# Patient Record
Sex: FEMALE | Race: WHITE | Employment: STUDENT | URBAN - METROPOLITAN AREA
[De-identification: names, ages, dates, MRNs, and addresses within clinical notes are randomized per-mention and may not be internally consistent; named-entity substitution may affect disease eponyms.]

---
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
## 2023-10-11 PROBLEM — E55.9 VITAMIN D INSUFFICIENCY: Status: ACTIVE | Noted: 2023-10-11

## 2023-10-11 PROBLEM — Z34.83 MULTIGRAVIDA IN THIRD TRIMESTER: Status: ACTIVE | Noted: 2021-05-12

## 2023-10-11 PROBLEM — D50.9 IRON DEFICIENCY ANEMIA OF PREGNANCY: Status: ACTIVE | Noted: 2023-10-11

## 2023-10-11 PROBLEM — O99.019 IRON DEFICIENCY ANEMIA OF PREGNANCY: Status: ACTIVE | Noted: 2023-10-11

## 2023-10-13 ENCOUNTER — HOSPITAL ENCOUNTER (OUTPATIENT)
Age: 23
Setting detail: OBSERVATION
Discharge: HOME | End: 2023-10-13
Attending: OBSTETRICS & GYNECOLOGY | Admitting: OBSTETRICS & GYNECOLOGY
Payer: MEDICAID

## 2023-10-13 VITALS
SYSTOLIC BLOOD PRESSURE: 115 MMHG | RESPIRATION RATE: 17 BRPM | TEMPERATURE: 98.1 F | BODY MASS INDEX: 28.85 KG/M2 | WEIGHT: 169 LBS | DIASTOLIC BLOOD PRESSURE: 55 MMHG | HEIGHT: 64 IN | HEART RATE: 99 BPM

## 2023-10-13 PROBLEM — A74.9 CHLAMYDIA: Status: ACTIVE | Noted: 2023-10-13

## 2023-10-13 PROBLEM — R10.30 LOWER ABDOMINAL PAIN: Status: ACTIVE | Noted: 2023-10-13

## 2023-10-13 LAB
AMNISURE, POC: NEGATIVE
BACTERIA URNS QL MICRO: ABNORMAL
BILIRUB UR QL STRIP: ABNORMAL
CLARITY UR: CLEAR
COLOR UR: YELLOW
CRYSTALS URNS MICRO: ABNORMAL /HPF
EPI CELLS #/AREA URNS HPF: ABNORMAL /HPF
GLUCOSE UR STRIP-MCNC: NEGATIVE MG/DL
HGB UR QL STRIP.AUTO: NEGATIVE
KETONES UR STRIP-MCNC: ABNORMAL MG/DL
LEUKOCYTE ESTERASE UR QL STRIP: ABNORMAL
Lab: NORMAL
NEGATIVE QC PASS/FAIL: NORMAL
NITRITE UR QL STRIP: NEGATIVE
PH UR STRIP: 6 [PH] (ref 5–9)
POSITIVE QC PASS/FAIL: NORMAL
PROT UR STRIP-MCNC: ABNORMAL MG/DL
RBC #/AREA URNS HPF: ABNORMAL /HPF
SP GR UR STRIP: >1.03 (ref 1–1.03)
UROBILINOGEN UR STRIP-ACNC: 1 EU/DL (ref 0–1)
WBC #/AREA URNS HPF: ABNORMAL /HPF

## 2023-10-13 PROCEDURE — 81001 URINALYSIS AUTO W/SCOPE: CPT

## 2023-10-13 PROCEDURE — 96360 HYDRATION IV INFUSION INIT: CPT

## 2023-10-13 PROCEDURE — 84112 EVAL AMNIOTIC FLUID PROTEIN: CPT

## 2023-10-13 PROCEDURE — 2580000003 HC RX 258: Performed by: ADVANCED PRACTICE MIDWIFE

## 2023-10-13 PROCEDURE — G0378 HOSPITAL OBSERVATION PER HR: HCPCS

## 2023-10-13 RX ORDER — SODIUM CHLORIDE, SODIUM LACTATE, POTASSIUM CHLORIDE, AND CALCIUM CHLORIDE .6; .31; .03; .02 G/100ML; G/100ML; G/100ML; G/100ML
500 INJECTION, SOLUTION INTRAVENOUS ONCE
Status: COMPLETED | OUTPATIENT
Start: 2023-10-13 | End: 2023-10-13

## 2023-10-13 RX ADMIN — SODIUM CHLORIDE, POTASSIUM CHLORIDE, SODIUM LACTATE AND CALCIUM CHLORIDE 500 ML: 600; 310; 30; 20 INJECTION, SOLUTION INTRAVENOUS at 17:27

## 2023-10-13 NOTE — PROGRESS NOTES
UA results reviewed with Dr. Shivani Rodriguez. Ok to discharge patient home encouraging to consume more fluids and follow up with Dr. Gabino Perez as scheduled. Patient verbalized understanding at bedside to this nurse.

## 2023-10-13 NOTE — PROGRESS NOTES
Patient presents to unit with complaints of pelvic pressure and ctx that started around 2 nights ago. She is a 3/2 36.5 patient of Dr. Telma Reyes.  Patient placed on EFM and house officer notified of arrival.

## 2023-10-13 NOTE — H&P
[id number] 36w5d   2, GBS: unknown  3. Fetal Heart Tracing category: 1  4. Not ruptured  5.  Not in Labor    DW Dr Niall Alston:   IV bolus  Send U/A    Electronically signed by Danial Sandhoff, APRN - CNM on 10/13/2023 at 4:14 PM

## 2023-10-25 ENCOUNTER — HOSPITAL ENCOUNTER (INPATIENT)
Age: 23
LOS: 2 days | Discharge: HOME | End: 2023-10-27
Attending: OBSTETRICS & GYNECOLOGY | Admitting: OBSTETRICS & GYNECOLOGY
Payer: MEDICAID

## 2023-10-25 ENCOUNTER — ANESTHESIA (OUTPATIENT)
Dept: LABOR AND DELIVERY | Age: 23
DRG: 560 | End: 2023-10-25
Payer: MEDICAID

## 2023-10-25 ENCOUNTER — ANESTHESIA EVENT (OUTPATIENT)
Dept: LABOR AND DELIVERY | Age: 23
DRG: 560 | End: 2023-10-25
Payer: MEDICAID

## 2023-10-25 PROBLEM — O98.813 CHLAMYDIA INFECTION AFFECTING PREGNANCY IN THIRD TRIMESTER: Status: ACTIVE | Noted: 2023-10-25

## 2023-10-25 PROBLEM — R10.30 LOWER ABDOMINAL PAIN: Status: RESOLVED | Noted: 2023-10-13 | Resolved: 2023-10-25

## 2023-10-25 PROBLEM — O36.5990 ASYMMETRIC INTRAUTERINE GROWTH RESTRICTION AFFECTING PREGNANCY, ANTEPARTUM: Status: ACTIVE | Noted: 2023-10-25

## 2023-10-25 PROBLEM — Z3A.38 38 WEEKS GESTATION OF PREGNANCY: Status: ACTIVE | Noted: 2023-10-25

## 2023-10-25 PROBLEM — O36.5930 POOR FETAL GROWTH AFFECTING MANAGEMENT OF MOTHER IN THIRD TRIMESTER: Status: ACTIVE | Noted: 2023-10-25

## 2023-10-25 PROBLEM — A74.9 CHLAMYDIA INFECTION AFFECTING PREGNANCY IN THIRD TRIMESTER: Status: ACTIVE | Noted: 2023-10-25

## 2023-10-25 PROBLEM — A74.9 CHLAMYDIA: Status: RESOLVED | Noted: 2023-10-13 | Resolved: 2023-10-25

## 2023-10-25 LAB
ABO + RH BLD: NORMAL
AMPHET UR QL SCN: NEGATIVE
ARM BAND NUMBER: NORMAL
BARBITURATES UR QL SCN: NEGATIVE
BENZODIAZ UR QL: NEGATIVE
BLOOD BANK SAMPLE EXPIRATION: NORMAL
BLOOD GROUP ANTIBODIES SERPL: NEGATIVE
BUPRENORPHINE UR QL: NEGATIVE
CANNABINOIDS UR QL SCN: NEGATIVE
COCAINE UR QL SCN: NEGATIVE
ERYTHROCYTE [DISTWIDTH] IN BLOOD BY AUTOMATED COUNT: 14.9 % (ref 11.5–15)
FENTANYL UR QL: NEGATIVE
HCT VFR BLD AUTO: 33.1 % (ref 34–48)
HGB BLD-MCNC: 10.7 G/DL (ref 11.5–15.5)
MCH RBC QN AUTO: 28.5 PG (ref 26–35)
MCHC RBC AUTO-ENTMCNC: 32.3 G/DL (ref 32–34.5)
MCV RBC AUTO: 88.3 FL (ref 80–99.9)
METHADONE UR QL: NEGATIVE
OPIATES UR QL SCN: NEGATIVE
OXYCODONE UR QL SCN: NEGATIVE
PCP UR QL SCN: NEGATIVE
PLATELET # BLD AUTO: 297 K/UL (ref 130–450)
PMV BLD AUTO: 10.3 FL (ref 7–12)
RBC # BLD AUTO: 3.75 M/UL (ref 3.5–5.5)
TEST INFORMATION: NORMAL
WBC OTHER # BLD: 13 K/UL (ref 4.5–11.5)

## 2023-10-25 PROCEDURE — 86901 BLOOD TYPING SEROLOGIC RH(D): CPT

## 2023-10-25 PROCEDURE — 51701 INSERT BLADDER CATHETER: CPT

## 2023-10-25 PROCEDURE — 2580000003 HC RX 258: Performed by: OBSTETRICS & GYNECOLOGY

## 2023-10-25 PROCEDURE — 1220000001 HC SEMI PRIVATE L&D R&B

## 2023-10-25 PROCEDURE — 2500000003 HC RX 250 WO HCPCS: Performed by: STUDENT IN AN ORGANIZED HEALTH CARE EDUCATION/TRAINING PROGRAM

## 2023-10-25 PROCEDURE — 86850 RBC ANTIBODY SCREEN: CPT

## 2023-10-25 PROCEDURE — 85027 COMPLETE CBC AUTOMATED: CPT

## 2023-10-25 PROCEDURE — 59050 FETAL MONITOR W/REPORT: CPT | Performed by: ADVANCED PRACTICE MIDWIFE

## 2023-10-25 PROCEDURE — 3700000025 EPIDURAL BLOCK: Performed by: STUDENT IN AN ORGANIZED HEALTH CARE EDUCATION/TRAINING PROGRAM

## 2023-10-25 PROCEDURE — 6360000002 HC RX W HCPCS: Performed by: OBSTETRICS & GYNECOLOGY

## 2023-10-25 PROCEDURE — 80307 DRUG TEST PRSMV CHEM ANLYZR: CPT

## 2023-10-25 PROCEDURE — 86900 BLOOD TYPING SEROLOGIC ABO: CPT

## 2023-10-25 RX ORDER — CARBOPROST TROMETHAMINE 250 UG/ML
250 INJECTION, SOLUTION INTRAMUSCULAR PRN
Status: DISCONTINUED | OUTPATIENT
Start: 2023-10-25 | End: 2023-10-26

## 2023-10-25 RX ORDER — SODIUM CHLORIDE, SODIUM LACTATE, POTASSIUM CHLORIDE, AND CALCIUM CHLORIDE .6; .31; .03; .02 G/100ML; G/100ML; G/100ML; G/100ML
500 INJECTION, SOLUTION INTRAVENOUS PRN
Status: DISCONTINUED | OUTPATIENT
Start: 2023-10-25 | End: 2023-10-26

## 2023-10-25 RX ORDER — SODIUM CHLORIDE, SODIUM LACTATE, POTASSIUM CHLORIDE, CALCIUM CHLORIDE 600; 310; 30; 20 MG/100ML; MG/100ML; MG/100ML; MG/100ML
INJECTION, SOLUTION INTRAVENOUS CONTINUOUS
Status: DISCONTINUED | OUTPATIENT
Start: 2023-10-25 | End: 2023-10-26

## 2023-10-25 RX ORDER — SODIUM CHLORIDE 0.9 % (FLUSH) 0.9 %
5-40 SYRINGE (ML) INJECTION EVERY 12 HOURS SCHEDULED
Status: DISCONTINUED | OUTPATIENT
Start: 2023-10-25 | End: 2023-10-26

## 2023-10-25 RX ORDER — MISOPROSTOL 200 UG/1
800 TABLET ORAL PRN
Status: DISCONTINUED | OUTPATIENT
Start: 2023-10-25 | End: 2023-10-26

## 2023-10-25 RX ORDER — SODIUM CHLORIDE 9 MG/ML
25 INJECTION, SOLUTION INTRAVENOUS PRN
Status: DISCONTINUED | OUTPATIENT
Start: 2023-10-25 | End: 2023-10-26

## 2023-10-25 RX ORDER — ACETAMINOPHEN 650 MG
TABLET, EXTENDED RELEASE ORAL
Status: COMPLETED
Start: 2023-10-25 | End: 2023-10-26

## 2023-10-25 RX ORDER — SODIUM CHLORIDE 0.9 % (FLUSH) 0.9 %
5-40 SYRINGE (ML) INJECTION PRN
Status: DISCONTINUED | OUTPATIENT
Start: 2023-10-25 | End: 2023-10-26

## 2023-10-25 RX ORDER — SODIUM CHLORIDE, SODIUM LACTATE, POTASSIUM CHLORIDE, AND CALCIUM CHLORIDE .6; .31; .03; .02 G/100ML; G/100ML; G/100ML; G/100ML
1000 INJECTION, SOLUTION INTRAVENOUS PRN
Status: DISCONTINUED | OUTPATIENT
Start: 2023-10-25 | End: 2023-10-26

## 2023-10-25 RX ORDER — NALOXONE HYDROCHLORIDE 0.4 MG/ML
INJECTION, SOLUTION INTRAMUSCULAR; INTRAVENOUS; SUBCUTANEOUS PRN
Status: DISCONTINUED | OUTPATIENT
Start: 2023-10-25 | End: 2023-10-26

## 2023-10-25 RX ORDER — ONDANSETRON 2 MG/ML
4 INJECTION INTRAMUSCULAR; INTRAVENOUS EVERY 6 HOURS PRN
Status: DISCONTINUED | OUTPATIENT
Start: 2023-10-25 | End: 2023-10-26

## 2023-10-25 RX ORDER — MORPHINE SULFATE 2 MG/ML
1 INJECTION, SOLUTION INTRAMUSCULAR; INTRAVENOUS
Status: DISCONTINUED | OUTPATIENT
Start: 2023-10-25 | End: 2023-10-26

## 2023-10-25 RX ORDER — METHYLERGONOVINE MALEATE 0.2 MG/ML
200 INJECTION INTRAVENOUS PRN
Status: DISCONTINUED | OUTPATIENT
Start: 2023-10-25 | End: 2023-10-26

## 2023-10-25 RX ORDER — LIDOCAINE HYDROCHLORIDE 10 MG/ML
INJECTION, SOLUTION INFILTRATION; PERINEURAL
Status: DISCONTINUED
Start: 2023-10-25 | End: 2023-10-26

## 2023-10-25 RX ADMIN — Medication 1 MILLI-UNITS/MIN: at 16:39

## 2023-10-25 RX ADMIN — Medication 15 ML/HR: at 21:06

## 2023-10-25 RX ADMIN — SODIUM CHLORIDE, POTASSIUM CHLORIDE, SODIUM LACTATE AND CALCIUM CHLORIDE: 600; 310; 30; 20 INJECTION, SOLUTION INTRAVENOUS at 16:37

## 2023-10-25 ASSESSMENT — LIFESTYLE VARIABLES: SMOKING_STATUS: 0

## 2023-10-25 NOTE — H&P
Department of Obstetrics and Gynecology  Labor and Delivery  History & Physical    Patient:  Jai Cabrera     Admit Date:  10/25/2023  3:50 PM  Medical Record Number:  [id number]    CHIEF COMPLAINT:  Pitocin IOL with BS=6 for asymmetrical IUGR    PROBLEM LIST:     Patient Active Problem List   Diagnosis    Multigravida in third trimester    Mixed anxiety and depressive disorder - takes Paxil 20 mg when not pregnant    Iron deficiency anemia of pregnancy    Vitamin D insufficiency    Poor fetal growth affecting management of mother in third trimester    Asymmetric intrauterine growth restriction affecting pregnancy, antepartum: EFW 5#2oz (<10%) AC 3.2%    Chlamydia infection affecting pregnancy in third trimester at 34w; DELMY completed 10/19 - NEGATIVE    38 3/7 weeks gestation of pregnancy           HISTORY OF PRESENT ILLNESS:    The patient is a 26 y.o. W female [id number] at 38w3d. Patient presents from the office 3cm dilated and a BS=10 for Pitocin IOL due to asymmetrical IUGR on US today done for lagging FH of 34cm at 38w. EFW 5#2oz (<10% with AC at 3,2%). In light of the favorable cervix the risk of poor fetal outcome should the pregnancy be allowed to continue exceeds the risk of fetal lung immaturity and the risk of  delivery. Patient denies contractions, LOF and VB. Has no sx UTI/PIH. ESTIMATED DUE DATE: Estimated Date of Delivery: 23    PRENATA Labs: Genital L CARE:  Complicated by: see HPI and problem list  GBS: negative the papers were for antihypertensives    Past OB History  OB History          3    Para   2    Term   2            AB        Living   2         SAB        IAB        Ectopic        Molar        Multiple   0    Live Births   2              Past Medical History:        Diagnosis Date    Anxiety     No Hx hospitalization. Has Psych and ounelor. Taking buspar. Heart murmur     Asymptomatic.     Iron deficiency anemia of pregnancy 10/11/2023    Mental disorder perineum    Continuous external fetal heart rate monitoring    External uterine contraction monitoring    I/O per unit routine    Initiate Hypoglycemia Treatment    Patient may have epidural    Full Code    Nonrebreather mask oxygen    TYPE AND SCREEN    Rupture of Membranes    Admit to L&D      Current Facility-Administered Medications   Medication Dose Route Frequency Provider Last Rate Last Admin    lactated ringers IV soln infusion   IntraVENous Continuous Davi Stanley  mL/hr at 10/25/23 1843 Rate Verify at 10/25/23 1843    0.9 % sodium chloride infusion  25 mL IntraVENous PRN Davi Stanley MD        [facility] injection 4 mg  4 mg IntraVENous Q6H PRN Davi Stanley MD        oxytocin (PITOCIN) 15 units in 250 mL infusion  1-20 sylvie-units/min IntraVENous Continuous Davi Stanley MD 4 mL/hr at 10/25/23 1843 4 sylvie-units/min at 10/25/23 1843    morphine (PF) injection 1 mg  1 mg IntraVENous Q3H PRN Davi Stanley MD         The risks of labor induction have been reviewed including the increased risk of  section and its associated risks. Questions were answered to patient's and the FOB's satisfaction. Davi Stanley MD, MPER.  FACOG  10/25/2023 6:43 PM

## 2023-10-25 NOTE — PROGRESS NOTES
Spoke with Dr. Yousif Partida on the phone and notified him of patient deceleration with minimal variability, now moderate variability with accelerations. When contractions increase, patient to be AROM and epidural when ready per Dr. Yousif Partida.

## 2023-10-25 NOTE — PROGRESS NOTES
Patient arrived to unit for IOL. SVE 3 cm per Dr. Nadya Kearney in office at 1500. Denies pain at this time, reports +FM, denies VB or LOF.  Call light within reach

## 2023-10-26 PROCEDURE — 7200000001 HC VAGINAL DELIVERY

## 2023-10-26 PROCEDURE — 1220000000 HC SEMI PRIVATE OB R&B

## 2023-10-26 PROCEDURE — 6370000000 HC RX 637 (ALT 250 FOR IP): Performed by: OBSTETRICS & GYNECOLOGY

## 2023-10-26 PROCEDURE — 10907ZC DRAINAGE OF AMNIOTIC FLUID, THERAPEUTIC FROM PRODUCTS OF CONCEPTION, VIA NATURAL OR ARTIFICIAL OPENING: ICD-10-PCS | Performed by: OBSTETRICS & GYNECOLOGY

## 2023-10-26 PROCEDURE — 3E033VJ INTRODUCTION OF OTHER HORMONE INTO PERIPHERAL VEIN, PERCUTANEOUS APPROACH: ICD-10-PCS | Performed by: OBSTETRICS & GYNECOLOGY

## 2023-10-26 PROCEDURE — 51701 INSERT BLADDER CATHETER: CPT

## 2023-10-26 RX ORDER — ACETAMINOPHEN 500 MG
1000 TABLET ORAL EVERY 6 HOURS PRN
Status: DISCONTINUED | OUTPATIENT
Start: 2023-10-26 | End: 2023-10-27 | Stop reason: HOSPADM

## 2023-10-26 RX ORDER — IBUPROFEN 600 MG/1
600 TABLET ORAL EVERY 6 HOURS PRN
Status: DISCONTINUED | OUTPATIENT
Start: 2023-10-26 | End: 2023-10-27 | Stop reason: HOSPADM

## 2023-10-26 RX ORDER — PROMETHAZINE HYDROCHLORIDE 25 MG/ML
6.25 INJECTION, SOLUTION INTRAMUSCULAR; INTRAVENOUS ONCE
Status: DISCONTINUED | OUTPATIENT
Start: 2023-10-26 | End: 2023-10-27 | Stop reason: HOSPADM

## 2023-10-26 RX ORDER — PRENATAL WITH FERROUS FUM AND FOLIC ACID 3080; 920; 120; 400; 22; 1.84; 3; 20; 10; 1; 12; 200; 27; 25; 2 [IU]/1; [IU]/1; MG/1; [IU]/1; MG/1; MG/1; MG/1; MG/1; MG/1; MG/1; UG/1; MG/1; MG/1; MG/1; MG/1
1 TABLET ORAL
Status: DISCONTINUED | OUTPATIENT
Start: 2023-10-26 | End: 2023-10-27 | Stop reason: HOSPADM

## 2023-10-26 RX ORDER — MODIFIED LANOLIN
OINTMENT (GRAM) TOPICAL PRN
Status: DISCONTINUED | OUTPATIENT
Start: 2023-10-26 | End: 2023-10-27 | Stop reason: HOSPADM

## 2023-10-26 RX ORDER — DOCUSATE SODIUM 100 MG/1
100 CAPSULE, LIQUID FILLED ORAL 2 TIMES DAILY
Status: DISCONTINUED | OUTPATIENT
Start: 2023-10-26 | End: 2023-10-27 | Stop reason: HOSPADM

## 2023-10-26 RX ADMIN — Medication: at 04:43

## 2023-10-26 RX ADMIN — IBUPROFEN 600 MG: 600 TABLET, FILM COATED ORAL at 16:55

## 2023-10-26 RX ADMIN — METFORMIN HYDROCHLORIDE 1 TABLET: 500 TABLET, EXTENDED RELEASE ORAL at 09:28

## 2023-10-26 RX ADMIN — ACETAMINOPHEN 1000 MG: 500 TABLET ORAL at 21:17

## 2023-10-26 RX ADMIN — Medication 166.7 ML: at 04:50

## 2023-10-26 RX ADMIN — DOCUSATE SODIUM 100 MG: 100 CAPSULE, LIQUID FILLED ORAL at 21:17

## 2023-10-26 RX ADMIN — DOCUSATE SODIUM 100 MG: 100 CAPSULE, LIQUID FILLED ORAL at 09:27

## 2023-10-26 ASSESSMENT — PAIN DESCRIPTION - LOCATION
LOCATION: ABDOMEN
LOCATION: ABDOMEN

## 2023-10-26 ASSESSMENT — PAIN SCALES - GENERAL
PAINLEVEL_OUTOF10: 7
PAINLEVEL_OUTOF10: 6

## 2023-10-26 ASSESSMENT — PAIN DESCRIPTION - ORIENTATION
ORIENTATION: LOWER
ORIENTATION: ANTERIOR;LOWER

## 2023-10-26 ASSESSMENT — PAIN DESCRIPTION - DESCRIPTORS: DESCRIPTORS: CRAMPING;DISCOMFORT

## 2023-10-26 NOTE — L&D DELIVERY NOTE
Department of Obstetrics and Gynecology  Spontaneous Vaginal Delivery Note    Patient:  Ayesha Maurer     Admit Date:  10/25/2023  3:50 PM  Medical Record Number:  [id number]   Procedure Date: 10/26/2023 5:15 AM     Pre-delivery Diagnosis:  Multigravid IUP at 38w 4d, asymmetrical IUGR, iron deficiency anemia, chlamydia infection in pregnancy (treated with negative test of cure), mixed anxiety/depressive disorder (on Paxil 20 mg when not pregnant, vitamin D insufficiency  Patient Active Problem List   Diagnosis    Multigravida in third trimester    Mixed anxiety and depressive disorder - takes Paxil 20 mg when not pregnant    Iron deficiency anemia of pregnancy    Vitamin D insufficiency    Poor fetal growth affecting management of mother in third trimester    Asymmetric intrauterine growth restriction affecting pregnancy, antepartum: EFW 5#2oz (<10%) AC 3.2%    Chlamydia infection affecting pregnancy in third trimester at [address]; DELMY completed 10/19 - NEGATIVE    38 4/7 weeks gestation of pregnancy       Post-delivery Diagnosis:  Living  infant Female    Information for the patient's :  Ayesha Zuniga [[id number]]   Normal Spontaneous Vaginal Delivery, uncomplicated     Delivering Clinician: Dariel     Infant Wt:   Information for the patient's :  Villa Mullins, Girl Amanda [[id number]]   5 pounds 9.2 ounces  2530 g     APGARS:     Information for the patient's :  Villa Mullins Girl Amanda [[id number]]   1 minute: 8  5 minute: 9    Anesthesia:  epidural anesthesia    Application and Delivery:  26 y.o.  W female [id number] at 41w2d with a history of mixed anxiety and depressive disorder, iron deficiency anemia, vitamin D insufficiency and a chlamydia infection in late third trimester (treated with a negative test of cure) was admitted from the office 3cm dilated and a BS=10 for Pitocin IOL due to asymmetrical IUGR on US today done for lagging FH of 34cm at 38w (EFW 5#2oz; <10% with AC at 3.2%) who progressed to complete with augmentation by amniotomy and delivered Cephalic, right occiput anterior presentation via  @ 0443, under epidural anesthesia anesthesia,  over an intact perineum without a resulting laceration, without dystocia or complication, a Live Born Female infant, weighing 5# 9oz, 2530 grams, Clear fluid at delivery, bulb suctioned on perineum. A nuchal cord was not present. A delayed cord clamping was performed. Spontaneous cry,  Apgar's 1 minute:  8; 5 minute:  9. The placenta was delivered with gentle traction and was noted to be small but intact, whole, and that the umbilical cord had three vessels noted. Episiotomy was not needed. Repair not necessary. Cervix, rectum, sphincter intact. Sponge, needle, and instrument counts correct x 2. Delivery Summary:  Ricky Owusu [[id number]]      Labor Events     Labor: No  Cervical Ripening Date/Time:      Antibiotics Received during Labor: No  Rupture Identifier: Sac 1  Rupture Date/Time:  10/25/23 21:27:00   Rupture Type: AROM  Fluid Color: Clear  Fluid Odor: None  Fluid Volume:  Moderate  Induction: Oxytocin, AROM  Augmentation: None  Labor Complications: None   Additional Complications:  OB: DELIVERY - COMPLICATIONS   Asymmetric intrauterine growth restriction affecting pregnancy, antepartum        Anesthesia    Method: Epidural    Labor Event Times      Labor onset date/time:  10/25/23 21:21:00     Dilation complete date/time:  10/26/23 03:55:00 EDT     Start pushing date/time:  10/26/2023 04:43:00     Labor Length    1st stage: 6h 34m  2nd stage: 0h 48m  3rd stage: 0h 07m    Delivery Details      Delivery Date: 10/26/23 Delivery Time: 04:43:00   Delivery Type: Vaginal, Spontaneous     Presentation    Presentation: Vertex  Position: Right  _: Occiput  _: Anterior    Shoulder Dystocia    Shoulder Dystocia Present?: No    Assisted Delivery Details    Forceps Attempted?: No  Vacuum Extractor Attempted?: No    Cord

## 2023-10-26 NOTE — PROGRESS NOTES
RN remained at bedside throughout pushing. EFM continuously assessed. Vaginal delivery of viable infant girl @0443. APGARS 8/9. Delayed cord clamping performed. Bonding skin to skin.

## 2023-10-26 NOTE — PROGRESS NOTES
Called Dr. Kadeem Monreal to notify pt is complete/+1 and baby had a 2 minute prolonged deceleration. States he is on his way for delivery.

## 2023-10-26 NOTE — PROGRESS NOTES
Called ou to Dr. Taylor Jimenez to update on pt status, preparation for epidural  and AROM. Requesting internal monitors at this time due to difficulty tracing ctx and FHT. Orders received to get IUPC and FSE placed when AROM'd.

## 2023-10-26 NOTE — PROCEDURES
Called to room for AROM at the request of Roxanna Coleman. Vanessa Galdamez, Patient's last menstrual period was 01/09/2023., Estimated Date of Delivery: 11/5/23, 38w3d. History reviewed and there is no significant change except: pt now 3/70/-1    Pitocin 8 mu    FHT's 140 with moderate variability, positive accels, neg  decels. Contractions q 1-4 minutes    VAGINAL EXAM: 3/70/-1    AROM risks and benefits reviewed and procedure explained. AROM performed. Amniotic Fluid: clear   Patient comfortable         Risks and benefits of  IUPC and FSE reviewed; procedures explained.    IUPC placed at 5:00 o'clock position    Patient tolerated well     Continued labor management per Dr Roxanna Coleman

## 2023-10-26 NOTE — ANESTHESIA PROCEDURE NOTES
Epidural Block    Patient location during procedure: OB  Start time: 10/25/2023 8:55 PM  End time: 10/25/2023 9:00 PM  Reason for block: labor epidural  Staffing  Performed: resident/CRNA   Anesthesiologist: Hakan Lozano MD  Resident/CRNA: Dulcie Habermann, APRN - CRNA  Other anesthesia staff: Marlon Pope RN  Performed by: Dulcie Habermann, APRN - CRNA  Authorized by: Hakan Lozano MD    Epidural  Patient position: sitting  Prep: Betadine  Patient monitoring: cardiac monitor, continuous pulse ox and frequent blood pressure checks  Approach: midline  Location: L3-4  Injection technique: ADELE air  Provider prep: mask and sterile gloves  Needle  Needle type: Tuohy   Needle gauge: 18 G  Needle length: 3.5 in  Needle insertion depth: 6.5 cm  Catheter type: end hole  Catheter size: 20 G.   Catheter at skin depth: 13 cm  Test dose: negativeCatheter Secured: tegaderm and tape  Assessment  Hemodynamics: stable  Attempts: 1  Outcomes: uncomplicated and patient tolerated procedure well  Preanesthetic Checklist  Completed: patient identified, IV checked, site marked, risks and benefits discussed, surgical/procedural consents, equipment checked, pre-op evaluation, timeout performed, anesthesia consent given, oxygen available, monitors applied/VS acknowledged, fire risk safety assessment completed and verbalized and blood product R/B/A discussed and consented

## 2023-10-26 NOTE — PROGRESS NOTES
David Markham at nurses station Updated on [address] not working.  States she will be in to replace EARLE

## 2023-10-26 NOTE — PROGRESS NOTES
Patient admitted into room and oriented to surroundings. Introduced self and wrote this RN's name and phone extension on patient's white board. Phone and nurse's call light at patient's bedside and instructed to use for any needs. Patient instructed on new admission informational packet at bedside . Patient verbalized understanding of all of the above. Received TDAP vaccine during pregnancy and is refusing FLU vaccine.

## 2023-10-27 VITALS
OXYGEN SATURATION: 96 % | RESPIRATION RATE: 18 BRPM | DIASTOLIC BLOOD PRESSURE: 64 MMHG | TEMPERATURE: 98.6 F | HEART RATE: 101 BPM | SYSTOLIC BLOOD PRESSURE: 113 MMHG

## 2023-10-27 PROBLEM — A74.9 CHLAMYDIA INFECTION AFFECTING PREGNANCY IN THIRD TRIMESTER: Status: RESOLVED | Noted: 2023-10-25 | Resolved: 2023-10-27

## 2023-10-27 PROBLEM — O98.813 CHLAMYDIA INFECTION AFFECTING PREGNANCY IN THIRD TRIMESTER: Status: RESOLVED | Noted: 2023-10-25 | Resolved: 2023-10-27

## 2023-10-27 PROBLEM — O36.5930 POOR FETAL GROWTH AFFECTING MANAGEMENT OF MOTHER IN THIRD TRIMESTER: Status: RESOLVED | Noted: 2023-10-25 | Resolved: 2023-10-27

## 2023-10-27 PROBLEM — Z34.83 MULTIGRAVIDA IN THIRD TRIMESTER: Status: RESOLVED | Noted: 2021-05-12 | Resolved: 2023-10-27

## 2023-10-27 PROBLEM — Z3A.38 38 WEEKS GESTATION OF PREGNANCY: Status: RESOLVED | Noted: 2023-10-25 | Resolved: 2023-10-27

## 2023-10-27 PROBLEM — O36.5990 ASYMMETRIC INTRAUTERINE GROWTH RESTRICTION AFFECTING PREGNANCY, ANTEPARTUM: Status: RESOLVED | Noted: 2023-10-25 | Resolved: 2023-10-27

## 2023-10-27 PROCEDURE — 6370000000 HC RX 637 (ALT 250 FOR IP): Performed by: OBSTETRICS & GYNECOLOGY

## 2023-10-27 RX ORDER — IBUPROFEN 600 MG/1
600 TABLET ORAL EVERY 6 HOURS PRN
Qty: 60 TABLET | Refills: 0 | Status: SHIPPED | OUTPATIENT
Start: 2023-10-27

## 2023-10-27 RX ORDER — MODIFIED LANOLIN
1 OINTMENT (GRAM) TOPICAL PRN
COMMUNITY
Start: 2023-10-27

## 2023-10-27 RX ORDER — PSEUDOEPHEDRINE HCL 30 MG
100 TABLET ORAL 2 TIMES DAILY PRN
COMMUNITY
Start: 2023-10-27

## 2023-10-27 RX ORDER — ACETAMINOPHEN 500 MG
1000 TABLET ORAL EVERY 6 HOURS PRN
Refills: 0 | COMMUNITY
Start: 2023-10-27

## 2023-10-27 RX ADMIN — METFORMIN HYDROCHLORIDE 1 TABLET: 500 TABLET, EXTENDED RELEASE ORAL at 08:19

## 2023-10-27 RX ADMIN — DOCUSATE SODIUM 100 MG: 100 CAPSULE, LIQUID FILLED ORAL at 08:20

## 2023-10-27 NOTE — PROGRESS NOTES
[facility] [place] [facility] screening results were discussed with parent. Questions answered. Brochure given to parent. Advised to monitor developmental milestones and contact physician for any concerns.    Alexsandra Caro

## 2023-10-27 NOTE — PROGRESS NOTES
Assumed patient care. RN at bedside to evaluate and assess patient. No complaints at this time, meds given. Pt resting comfortably in bed, call light in reach.

## 2023-10-27 NOTE — PLAN OF CARE
Problem: Vaginal Birth or  Section  Goal: Fetal and maternal status remain reassuring during the birth process  Description:  Birth OB-Pregnancy care plan goal which identifies if the fetal and maternal status remain reassuring during the birth process  10/27/2023 0952 by Chelsey Shin RN  Outcome: Progressing  10/27/2023 0248 by Shea Edwards RN  Outcome: Progressing     Problem: Postpartum  Goal: Experiences normal postpartum course  Description:  Postpartum OB-Pregnancy care plan goal which identifies if the mother is experiencing a normal postpartum course  10/27/2023 0952 by Chelsey Shin RN  Outcome: Progressing  10/27/2023 0248 by Shea Edwards RN  Outcome: Progressing  Goal: Appropriate maternal -  bonding  Description:  Postpartum OB-Pregnancy care plan goal which identifies if the mother and  are bonding appropriately  10/27/2023 0952 by Chelsey Shin RN  Outcome: Progressing  10/27/2023 0248 by Shea Edwards RN  Outcome: Progressing  Goal: Establishment of infant feeding pattern  Description:  Postpartum OB-Pregnancy care plan goal which identifies if the mother is establishing a feeding pattern with their   10/27/2023 4477 by Chlesey Shin RN  Outcome: Progressing  10/27/2023 0248 by Shea Edwards RN  Outcome: Progressing  Goal: Incisions, wounds, or drain sites healing without S/S of infection  10/27/2023 0952 by Chelsey Shin RN  Outcome: Progressing  10/27/2023 0248 by Shea Edwards RN  Outcome: Progressing  Flowsheets (Taken 10/26/2023 2305)  Incisions, Wounds, or Drain Sites Healing Without Sign and Symptoms of Infection: ADMISSION and DAILY: Assess and document risk factors for pressure ulcer development     Problem: Pain  Goal: Verbalizes/displays adequate comfort level or baseline comfort level  10/27/2023 0952 by Chelsey Shin RN  Outcome: Progressing  10/27/2023 0248 by Shea Edwards RN  Outcome: Progressing  Flowsheets (Taken 10/26/2023 2305)  Verbalizes/displays adequate comfort level or baseline comfort level:   Encourage patient to monitor pain and request assistance   Assess pain using appropriate pain scale     Problem: Infection - Adult  Goal: Absence of infection at discharge  10/27/2023 0952 by Junior Stone RN  Outcome: Progressing  10/27/2023 0248 by Hari Hickey RN  Outcome: Progressing  Goal: Absence of infection during hospitalization  10/27/2023 0952 by Junior Stone RN  Outcome: Progressing  10/27/2023 0248 by Hari Hickey RN  Outcome: Progressing  Goal: Absence of fever/infection during anticipated neutropenic period  10/27/2023 0952 by Junior Stone RN  Outcome: Progressing  10/27/2023 0248 by Hari Hickey RN  Outcome: Progressing     Problem: Safety - Adult  Goal: Free from fall injury  10/27/2023 0952 by Junior Stone RN  Outcome: Progressing  10/27/2023 0248 by Hari Hickey RN  Outcome: Progressing     Problem: Discharge Planning  Goal: Discharge to home or other facility with appropriate resources  10/27/2023 0952 by Junior Stone RN  Outcome: Progressing  10/27/2023 0248 by Hari Hickey RN  Outcome: Progressing

## 2023-10-27 NOTE — ANESTHESIA POSTPROCEDURE EVALUATION
Department of Anesthesiology  Postprocedure Note    Patient: Kimberley Patterson  MRN: [id number]  YOB: 2000  Date of evaluation: 10/27/2023      Procedure Summary     Date: 10/25/23 Room / Location:     Anesthesia Start: 2050 Anesthesia Stop:     Procedure: Labor Analgesia Diagnosis:     Scheduled Providers:  Responsible Provider: Danita Sanchez MD    Anesthesia Type: general, spinal, epidural ASA Status: 2          Anesthesia Type: No value filed.     Tete Phase I:      Tete Phase II:        Anesthesia Post Evaluation    Patient location during evaluation: bedside  Patient participation: complete - patient participated  Level of consciousness: awake and alert  Pain score: 0  Airway patency: patent  Nausea & Vomiting: no vomiting and no nausea  Complications: no  Cardiovascular status: blood pressure returned to baseline and hemodynamically stable  Respiratory status: acceptable and room air  Hydration status: stable  Pain management: adequate

## 2023-10-27 NOTE — DISCHARGE INSTR - DIET

## 2023-10-27 NOTE — DISCHARGE SUMMARY
n/a    Discharge Condition:  Level of Function:  Stable  Caregiver Arrangements and Educational Efforts: Written Discharge Instructions were verbally reviewed and given to the Patient. Special Problems: None    Plans For Continuing Care:  Unreported Test Results and Intended Follow-Up: 6 week(s) time. Instructions for Physical Activity: No driving for 1 week(s). No sex or tampon use for 6 weeks. No douching. No heavy lifting (greater than baby and carrier) for 6 weeks. Frequent ambulation with stairs - start slowly then increase as tolerated. Return to work in 6 weeks. Showers are fine - avoid bath tubs, hot tubs, swimming. Instructions for Diet: Regular diet  Discharge Medications:  Current Discharge Medication List        START taking these medications    Details   docusate sodium (COLACE, DULCOLAX) 100 MG CAPS Take 100 mg by mouth 2 times daily as needed for Constipation      benzocaine-menthol (DERMOPLAST) 20-0.5 % AERO spray Apply topically as needed for Pain . May use hospital sample at home as needed. Refills: 0      ibuprofen (ADVIL;MOTRIN) 600 MG tablet Take 1 tablet by mouth every 6 hours as needed for Pain  Qty: 60 tablet, Refills: 0      lansinoh lanolin CREA ointment Apply 1 Application topically as needed for Dry Skin (nipple discomfort)      witch hazel-glycerin (TUCKS) pad Place rectally as needed.   Refills: 4      acetaminophen (TYLENOL) 500 MG tablet Take 2 tablets by mouth every 6 hours as needed for Pain  Refills: 0           CONTINUE these medications which have NOT CHANGED    Details   Prenatal Vit-Fe Fumarate-FA (PRENATAL VITAMIN) 27-0.8 MG TABS Take 1 tablet by mouth daily  Qty: 30 tablet, Refills: 12           STOP taking these medications       ferrous sulfate (IRON 325) 325 (65 Fe) MG tablet Comments:   Reason for Stopping:         doxyLAMINE succinate (GNP SLEEP AID) 25 MG tablet Comments:   Reason for Stopping:         vitamin B-6 (PYRIDOXINE) 50 MG tablet Comments:   Reason for Stopping: Follow-Up Visit Plan: In 6 weeks for final postpartum visit. Disposition: Home. Time Spent on Discharge:  30 minutes were spent in patient examination, evaluation, counseling as well as medication reconciliation, prescriptions for required medications, discharge plan and follow up.       Umair Kilpatrick MD MD,FACOG    10/27/2023 3:31 PM

## 2023-10-27 NOTE — DISCHARGE INSTR - ACTIVITY
After Your Delivery Discharge Instructions    What to do after you leave the hospital:    Recommended diet: regular diet  Recommended activity: No driving for 1 weeks, no sex or tampon use for 6 weeks. No douching. No heavy lifting (greater than baby and carrier) for 6 weeks. Initially, avoid frequent ambulation with stairs - start slowly then increase as tolerated. You may return to work in 6 weeks. Showers are fine - avoid bath tubs, hot tubs, swimming. Follow-up in 6 weeks for final postpartum visit. Call the Physician with any of these signs and symptoms:    Warning signs regarding stitches:  \"Popping\" of stitches  Foul smelling discharge or pus  More redness or streaks around stitches than before    Perineum / episiotomy care:  Continue care as in the hospital (sitz baths, squirt bottle, etc.)  No tub baths until OK'd by your Physician , showers are fine     After your delivery - signs and symptoms to watch for:  Fever - Oral temperature greater than 100.4 degrees Fahrenheit  Foul-smelling vaginal discharge  Headache unrelieved by \"pain meds\"  Difficulty urinating  Breasts reddened, hard, hot to the touch  Nipple discharge which is foul-smelling or contains pus  Increased pain at the site of the vaginal area  Difficulty breathing with or without chest pain  New calf pain especially if only on one side  Sudden, continuing increased vaginal bleeding (heavier than a period) with or without clots  Unrelieved feelings of:   Inability to cope  Sadness  Anxiety  Lack of interest in baby  Insomnia  Crying     What to do at home:  Resume activity gradually   Don't lift anything heavier than baby and carrier until OK'd by your Physician   No sex until OK'd by your Physician  Eat regular nutritious meals  Take pain medication as prescribed whenever you need them  To avoid/relieve constipation take stool softeners if advised   Increase fiber in your diet    Most importantly ENJOY your Baby!  - Dr. Mami Billings =)))    Please

## 2023-10-27 NOTE — DISCHARGE INSTRUCTIONS
Follow up with your OB doctor in  1 week for a  delivery or in 6 weeks for a vaginal delivery unless otherwise instructed. Call office for appointment. .  For breastfeeding support, you can contact our lactation specialists at [phone] or   [phone]. DIET  Eat a well balanced diet focusing on foods high in fiber and protein  Drink plenty of fluids especially water. To avoid constipation you may take a mild stool softener as recommended by your doctor or midwife. ACTIVITY  Gradually increase your activity. Resume exercise regimen only after advised by your doctor or midwife. Avoid lifting anything heavier than your baby or a gallon of milk until OK'd by your physician or midlife. Avoid driving until your doctor or midwife has given their approval.  [place] slowly from a lying to sitting and then a standing position. Climb stairs one at a time. Use caution when carrying your baby up and down the stairs. No sexual activity for 6 weeks or until advised by your doctor - Nothing in vagina: intercourse, tampons, or douching. Be prepared to discuss family planning at your follow-up OB visit. You may feel tired or have a lack of energy. You may continue your prenatal vitamin to replenish nutrients post delivery. Nap when infant naps to catch up on sleep. May return to work or school in 6 weeks or as directed by physician or midlife. Take pain medication as prescribed whenever you need them  Take care of yourself by sleeping/resting as much as possible. Let someone else care for you, your infant and housework as much as possible for a while. EMOTIONS  You may feed hooper, sad, teary, & overwhelmed. If infant will not stop crying, contact another adult for help or place infant in their crib on their back and take a break. NEVER shake your infant.     Call your doctor if you have unrelieved feelings of: inability to cope,

## 2023-10-27 NOTE — PROGRESS NOTES
Pt discharged in stable condition. All discharge questions answered and pt verbalizes understanding. Pt signed off on discharge via [employer]. Pt wheeled off unit with significant other and baby girl.